# Patient Record
Sex: FEMALE | Race: WHITE | Employment: OTHER | URBAN - METROPOLITAN AREA
[De-identification: names, ages, dates, MRNs, and addresses within clinical notes are randomized per-mention and may not be internally consistent; named-entity substitution may affect disease eponyms.]

---

## 2018-01-30 ENCOUNTER — PATIENT OUTREACH (OUTPATIENT)
Dept: FAMILY MEDICINE CLINIC | Facility: CLINIC | Age: 48
End: 2018-01-30

## 2018-05-31 ENCOUNTER — HOSPITAL ENCOUNTER (EMERGENCY)
Facility: HOSPITAL | Age: 48
Discharge: HOME | End: 2018-05-31
Attending: EMERGENCY MEDICINE
Payer: MEDICARE

## 2018-05-31 VITALS
OXYGEN SATURATION: 98 % | SYSTOLIC BLOOD PRESSURE: 128 MMHG | HEIGHT: 66 IN | TEMPERATURE: 98 F | BODY MASS INDEX: 22.5 KG/M2 | RESPIRATION RATE: 20 BRPM | DIASTOLIC BLOOD PRESSURE: 66 MMHG | HEART RATE: 65 BPM | WEIGHT: 140 LBS

## 2018-05-31 DIAGNOSIS — S51.852A DOG BITE OF LEFT FOREARM, INITIAL ENCOUNTER: Primary | ICD-10-CM
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
## 2018-05-31 PROCEDURE — 99283 EMERGENCY DEPT VISIT LOW MDM: CPT

## 2018-05-31 RX ORDER — AMOXICILLIN AND CLAVULANATE POTASSIUM 875; 125 MG/1; MG/1
1 TABLET, FILM COATED ORAL 2 TIMES DAILY
Qty: 20 TABLET | Refills: 0 | Status: SHIPPED | OUTPATIENT
Start: 2018-05-31 | End: 2018-06-05

## 2018-05-31 RX ORDER — DIAPER,BRIEF,INFANT-TODD,DISP
EACH MISCELLANEOUS AS NEEDED
Status: DISCONTINUED | OUTPATIENT
Start: 2018-05-31 | End: 2018-05-31

## 2018-05-31 RX ORDER — DIAPER,BRIEF,INFANT-TODD,DISP
EACH MISCELLANEOUS ONCE
Status: COMPLETED | OUTPATIENT
Start: 2018-05-31 | End: 2018-05-31

## 2018-05-31 NOTE — ED PROVIDER NOTES
Patient Seen in: [facility] AND [facility] Emergency Department    History   Patient presents with:  Bite (integumentary)    Stated Complaint: dog bite left forearm    HPI    53year old female complains of being bitten in her left forearm while breaking up a dog All other systems reviewed and negative except as noted above. PSFH elements reviewed from today and agreed except as otherwise stated in HPI.     Physical Exam   ED Triage Vitals [05/31/18 0228]  BP: 150/72  Pulse: 67  Resp: 18  Temp: 98.4 °F (36.9 °C) ------------------------------------------------------------    Imaging Results Available and Reviewed while here: No orders to display    ED Medications Administered: Medications - No data to display      Procedures: Wounds irrigated and dressed with anti Anticipatory guidance, discharge instructions, disease/injury specific precautions, return instructions, and follow up information were provided. Condition stable upon discharge.  We also recommended that the patient schedule follow up care with a primary c

## 2018-05-31 NOTE — ED INITIAL ASSESSMENT (HPI)
Pt reports dog bite to left arm from foster dog. Pt has multiple bites to forearms. Dog up to date on shots.

## 2018-08-01 ENCOUNTER — HOSPITAL ENCOUNTER (EMERGENCY)
Facility: HOSPITAL | Age: 48
Discharge: HOME | End: 2018-08-01
Attending: EMERGENCY MEDICINE
Payer: MEDICARE

## 2018-08-01 ENCOUNTER — APPOINTMENT (OUTPATIENT)
Dept: GENERAL RADIOLOGY | Facility: HOSPITAL | Age: 48
End: 2018-08-01
Attending: EMERGENCY MEDICINE
Payer: MEDICARE

## 2018-08-01 VITALS
WEIGHT: 220 LBS | HEIGHT: 66 IN | BODY MASS INDEX: 35.36 KG/M2 | DIASTOLIC BLOOD PRESSURE: 80 MMHG | HEART RATE: 60 BPM | RESPIRATION RATE: 18 BRPM | SYSTOLIC BLOOD PRESSURE: 118 MMHG | OXYGEN SATURATION: 98 % | TEMPERATURE: 97 F

## 2018-08-01 DIAGNOSIS — S16.1XXA STRAIN OF NECK MUSCLE, INITIAL ENCOUNTER: Primary | ICD-10-CM

## 2018-08-01 PROCEDURE — 99284 EMERGENCY DEPT VISIT MOD MDM: CPT

## 2018-08-01 PROCEDURE — 96372 THER/PROPH/DIAG INJ SC/IM: CPT

## 2018-08-01 PROCEDURE — 72040 X-RAY EXAM NECK SPINE 2-3 VW: CPT | Performed by: EMERGENCY MEDICINE

## 2018-08-01 RX ORDER — KETOROLAC TROMETHAMINE 30 MG/ML
60 INJECTION, SOLUTION INTRAMUSCULAR; INTRAVENOUS ONCE
Status: COMPLETED | OUTPATIENT
Start: 2018-08-01 | End: 2018-08-01

## 2018-08-01 RX ORDER — ZOLPIDEM TARTRATE 10 MG/1
10 TABLET ORAL NIGHTLY PRN
COMMUNITY
End: 2019-07-02

## 2018-08-01 RX ORDER — OMEPRAZOLE 20 MG/1
20 CAPSULE, DELAYED RELEASE ORAL
COMMUNITY
End: 2019-06-03

## 2018-08-01 RX ORDER — CYCLOBENZAPRINE HCL 5 MG
5 TABLET ORAL 2 TIMES DAILY PRN
Qty: 15 TABLET | Refills: 0 | Status: SHIPPED | OUTPATIENT
Start: 2018-08-01 | End: 2019-06-03 | Stop reason: DRUGHIGH

## 2018-08-01 NOTE — ED PROVIDER NOTES
Patient Seen in: [facility] AND [facility] Emergency Department    History   Patient presents with:  Neck Pain (musculoskeletal, neurologic)    Stated Complaint: Neck pain x1 week     HPI    Patient presents the emergency department with complaint of left sided 0.00      Years: 0.00         Quit date: 8/1/1998  Smokeless tobacco: Never Used                      Alcohol use:  No                Review of Systems  Constitutional: no fever  Cardiovascular: no chest pain  Respiratory: no shortness of breath  Micky Yeager injection of Toradol I favor this being muscle spasm of the neck    Patient reexamination said slight improvement in pain x-ray does show muscle spasm likely with some mild DJD we discussed treatment activity levels as well as follow-up with her doctor.

## 2019-05-19 ENCOUNTER — HOSPITAL ENCOUNTER (EMERGENCY)
Facility: HOSPITAL | Age: 49
Discharge: HOME | End: 2019-05-19

## 2019-05-19 ENCOUNTER — APPOINTMENT (OUTPATIENT)
Dept: GENERAL RADIOLOGY | Facility: HOSPITAL | Age: 49
End: 2019-05-19
Attending: NURSE PRACTITIONER

## 2019-05-19 ENCOUNTER — APPOINTMENT (OUTPATIENT)
Dept: CT IMAGING | Facility: HOSPITAL | Age: 49
End: 2019-05-19
Attending: NURSE PRACTITIONER

## 2019-05-19 VITALS
OXYGEN SATURATION: 98 % | HEART RATE: 71 BPM | SYSTOLIC BLOOD PRESSURE: 135 MMHG | WEIGHT: 183 LBS | DIASTOLIC BLOOD PRESSURE: 88 MMHG | HEIGHT: 69 IN | BODY MASS INDEX: 27.11 KG/M2 | RESPIRATION RATE: 18 BRPM | TEMPERATURE: 98 F
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
## 2019-05-19 DIAGNOSIS — S80.01XA CONTUSION OF RIGHT KNEE, INITIAL ENCOUNTER: ICD-10-CM

## 2019-05-19 DIAGNOSIS — M25.512 ACUTE PAIN OF LEFT SHOULDER: ICD-10-CM

## 2019-05-19 DIAGNOSIS — S09.90XA CLOSED HEAD INJURY, INITIAL ENCOUNTER: ICD-10-CM

## 2019-05-19 PROCEDURE — 73560 X-RAY EXAM OF KNEE 1 OR 2: CPT | Performed by: NURSE PRACTITIONER

## 2019-05-19 PROCEDURE — 73030 X-RAY EXAM OF SHOULDER: CPT | Performed by: NURSE PRACTITIONER

## 2019-05-19 PROCEDURE — 99284 EMERGENCY DEPT VISIT MOD MDM: CPT | Performed by: NURSE PRACTITIONER

## 2019-05-19 PROCEDURE — 70450 CT HEAD/BRAIN W/O DYE: CPT | Performed by: NURSE PRACTITIONER

## 2019-05-19 PROCEDURE — 81025 URINE PREGNANCY TEST: CPT

## 2019-05-19 RX ORDER — HYDROCODONE BITARTRATE AND ACETAMINOPHEN 5; 325 MG/1; MG/1
1-2 TABLET ORAL EVERY 6 HOURS PRN
Qty: 10 TABLET | Refills: 0 | Status: SHIPPED | OUTPATIENT
Start: 2019-05-19 | End: 2019-05-26

## 2019-05-19 RX ORDER — ACETAMINOPHEN 500 MG
1000 TABLET ORAL ONCE
Status: COMPLETED | OUTPATIENT
Start: 2019-05-19 | End: 2019-05-19
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
## 2019-05-19 NOTE — ED NOTES
Patient provided discharge instructions. Patient provided with instructions of how and when to follow up with healthcare providers. Patient provided instructions of when to seek emergency care. Paper prescriptions from ER physician provided to patient.  Gino Casas

## 2019-06-03 PROBLEM — M79.7 FIBROMYALGIA: Status: ACTIVE | Noted: 2019-01-01

## 2019-07-02 PROBLEM — F32.4 MAJOR DEPRESSIVE DISORDER WITH SINGLE EPISODE, IN PARTIAL REMISSION (HCC): Status: ACTIVE | Noted: 2019-07-02

## 2019-07-02 PROBLEM — F51.01 PRIMARY INSOMNIA: Status: ACTIVE | Noted: 2019-07-02

## 2019-09-10 PROBLEM — Z98.890 S/P BILATERAL INGUINAL HERNIA REPAIR: Status: ACTIVE | Noted: 2019-09-10

## 2019-09-10 PROBLEM — E66.01 CLASS 2 SEVERE OBESITY DUE TO EXCESS CALORIES WITH SERIOUS COMORBIDITY AND BODY MASS INDEX (BMI) OF 39.0 TO 39.9 IN ADULT: Status: ACTIVE | Noted: 2019-09-10

## 2019-09-10 PROBLEM — Z87.19 S/P BILATERAL INGUINAL HERNIA REPAIR: Status: ACTIVE | Noted: 2019-09-10

## 2019-09-10 PROBLEM — E66.01 CLASS 2 SEVERE OBESITY DUE TO EXCESS CALORIES WITH SERIOUS COMORBIDITY AND BODY MASS INDEX (BMI) OF 36.0 TO 36.9 IN ADULT (HCC): Status: ACTIVE | Noted: 2019-09-10

## 2019-09-10 PROBLEM — E66.01 CLASS 2 SEVERE OBESITY DUE TO EXCESS CALORIES WITH SERIOUS COMORBIDITY AND BODY MASS INDEX (BMI) OF 36.0 TO 36.9 IN ADULT: Status: ACTIVE | Noted: 2019-09-10

## 2019-09-10 PROBLEM — E66.01 CLASS 2 SEVERE OBESITY DUE TO EXCESS CALORIES WITH SERIOUS COMORBIDITY AND BODY MASS INDEX (BMI) OF 39.0 TO 39.9 IN ADULT (HCC): Status: ACTIVE | Noted: 2019-09-10

## 2019-12-06 PROBLEM — F32.A DEPRESSION: Status: ACTIVE | Noted: 2019-04-18

## 2019-12-06 PROBLEM — M21.70 LEG LENGTH DISCREPANCY: Status: ACTIVE | Noted: 2017-10-26

## 2019-12-06 PROBLEM — M79.7 FIBROMYALGIA AFFECTING MULTIPLE SITES: Status: ACTIVE | Noted: 2018-02-01

## 2020-02-13 PROBLEM — K22.70 BARRETT'S ESOPHAGUS: Status: ACTIVE | Noted: 2019-12-26

## 2020-08-14 PROBLEM — F32.A DEPRESSION: Status: RESOLVED | Noted: 2019-04-18 | Resolved: 2020-08-14

## 2021-04-11 DIAGNOSIS — Z23 NEED FOR VACCINATION: ICD-10-CM

## 2022-03-21 PROBLEM — M54.16 LUMBAR RADICULOPATHY: Status: ACTIVE | Noted: 2022-03-21

## 2022-05-05 ENCOUNTER — HOSPITAL ENCOUNTER (EMERGENCY)
Facility: HOSPITAL | Age: 52
Discharge: HOME | End: 2022-05-06
Attending: EMERGENCY MEDICINE
Payer: MEDICARE
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
## 2022-05-05 PROCEDURE — 96372 THER/PROPH/DIAG INJ SC/IM: CPT

## 2022-05-05 PROCEDURE — 99284 EMERGENCY DEPT VISIT MOD MDM: CPT

## 2022-05-05 PROCEDURE — 90471 IMMUNIZATION ADMIN: CPT

## 2022-05-05 RX ORDER — AMOXICILLIN AND CLAVULANATE POTASSIUM 875; 125 MG/1; MG/1
1 TABLET, FILM COATED ORAL 2 TIMES DAILY
Qty: 14 TABLET | Refills: 0 | Status: SHIPPED | OUTPATIENT
Start: 2022-05-05 | End: 2022-05-12

## 2022-05-06 VITALS
WEIGHT: 213.88 LBS | HEIGHT: 66 IN | OXYGEN SATURATION: 97 % | SYSTOLIC BLOOD PRESSURE: 141 MMHG | RESPIRATION RATE: 18 BRPM | BODY MASS INDEX: 34.37 KG/M2 | DIASTOLIC BLOOD PRESSURE: 85 MMHG | TEMPERATURE: 98 F | HEART RATE: 57 BPM

## 2022-05-06 NOTE — ED QUICK NOTES
Patient given rabies vaccine to right deltoid. Patient given Rabies immune globulin first to left finger site of bite by HU Castillo. Total dose was 6mL. Approx 0.5mL injected by HU at site of bite to left pointer finger. 3mL given to left deltoid (max dose for deltoid muscle). Remainder of 2.5mL given to left thigh. Site of injections discussed with pharmacy, charge RN, and HU Castillo. Patient aware of follow-up for rabies vaccine. Tdap UTD.

## 2022-05-06 NOTE — ED QUICK NOTES
Patient presents to ED after being bit by a raccoon around 6:30pm tonight. Patient states she was wearing extraction gloves when trying to help the animal and the animal bit through the gloves and broke her skin. Patient states the raccoon appeared sick.

## 2022-05-08 ENCOUNTER — HOSPITAL ENCOUNTER (OUTPATIENT)
Age: 52
Discharge: HOME | End: 2022-05-08
Payer: MEDICARE

## 2022-05-08 VITALS — TEMPERATURE: 98 F

## 2022-05-08 PROCEDURE — 90675 RABIES VACCINE IM: CPT | Performed by: NURSE PRACTITIONER

## 2022-05-08 PROCEDURE — 90471 IMMUNIZATION ADMIN: CPT | Performed by: NURSE PRACTITIONER

## 2022-05-09 NOTE — CM/SW NOTE
Spoke with patient at [phone] regarding her follow up series of rabies vaccinations that she has been instructed to complete. Patient bitten by raccoon on 5/4/ and came to ER on 5/5/22. Rabies series of vaccinations initiated in ER on 5/5/22. Day #0  = 5/5/22   -  Initial Rabies shot   Received  Day #3  = 5/8/22   -  2nd Shot                Received  Day #7  = 5/12/22 -  3rd Shot                 ________  Day #14= 5/19/22  - 4th Shot                 ________    Patient stated that she still had the raccoon after she was bitten, and the [employer] came and took raccoon, killed it and are testing animal for rabies. Patient stated that she will hear on Thursday if raccoon had rabies or not. Patient stated that if animal comes back negative for rabies, she will not be completing the rest of the rabies shot series. If raccoon comes back positive, she will get last 2 rabies shots on above dates. No need to call patient again. MARILOU reached out to [facility] at [id number], and she confirmed that patient does not have to complete the rabies series IF animal does not have rabies, but patient should be contacted by [facility] with results.

## 2022-08-03 ENCOUNTER — HOSPITAL ENCOUNTER (OUTPATIENT)
Age: 52
Discharge: HOME | End: 2022-08-03
Payer: MEDICARE

## 2022-08-03 VITALS
OXYGEN SATURATION: 100 % | HEART RATE: 77 BPM | SYSTOLIC BLOOD PRESSURE: 139 MMHG | TEMPERATURE: 98 F | DIASTOLIC BLOOD PRESSURE: 92 MMHG | RESPIRATION RATE: 16 BRPM

## 2022-08-03 DIAGNOSIS — R07.89 CHEST PAIN, NON-CARDIAC: Primary | ICD-10-CM

## 2022-08-03 LAB
#MXD IC: 0.6 X10ˆ3/UL (ref 0.1–1)
BUN BLD-MCNC: 9 MG/DL (ref 7–18)
CHLORIDE BLD-SCNC: 106 MMOL/L (ref 98–112)
CO2 BLD-SCNC: 24 MMOL/L (ref 21–32)
CREAT BLD-MCNC: 0.8 MG/DL
GFR SERPLBLD BASED ON 1.73 SQ M-ARVRAT: 89 ML/MIN/1.73M2 (ref 60–?)
GLUCOSE BLD-MCNC: 104 MG/DL (ref 70–99)
HCT VFR BLD AUTO: 41.4 %
HCT VFR BLD CALC: 44 %
HGB BLD-MCNC: 13.4 G/DL
ISTAT IONIZED CALCIUM FOR CHEM 8: 1.22 MMOL/L (ref 1.12–1.32)
LYMPHOCYTES # BLD AUTO: 3.3 X10ˆ3/UL (ref 1–4)
LYMPHOCYTES NFR BLD AUTO: 33.1 %
MCH RBC QN AUTO: 26.4 PG (ref 26–34)
MCHC RBC AUTO-ENTMCNC: 32.4 G/DL (ref 31–37)
MCV RBC AUTO: 81.7 FL (ref 80–100)
MIXED CELL %: 5.5 %
NEUTROPHILS # BLD AUTO: 6.2 X10ˆ3/UL (ref 1.5–7.7)
NEUTROPHILS NFR BLD AUTO: 61.4 %
PLATELET # BLD AUTO: 323 X10ˆ3/UL (ref 150–450)
POTASSIUM BLD-SCNC: 4.3 MMOL/L (ref 3.6–5.1)
RBC # BLD AUTO: 5.07 X10ˆ6/UL
SODIUM BLD-SCNC: 140 MMOL/L (ref 136–145)
TROPONIN I BLD-MCNC: <0.02 NG/ML
WBC # BLD AUTO: 10.1 X10ˆ3/UL (ref 4–11)

## 2022-08-03 PROCEDURE — 99214 OFFICE O/P EST MOD 30 MIN: CPT | Performed by: PHYSICIAN ASSISTANT

## 2022-08-03 PROCEDURE — 85025 COMPLETE CBC W/AUTO DIFF WBC: CPT | Performed by: PHYSICIAN ASSISTANT

## 2022-08-03 PROCEDURE — 93000 ELECTROCARDIOGRAM COMPLETE: CPT | Performed by: PHYSICIAN ASSISTANT

## 2022-08-03 PROCEDURE — 80047 BASIC METABLC PNL IONIZED CA: CPT | Performed by: PHYSICIAN ASSISTANT

## 2022-08-03 PROCEDURE — 84484 ASSAY OF TROPONIN QUANT: CPT | Performed by: PHYSICIAN ASSISTANT

## 2023-02-09 ENCOUNTER — LAB ENCOUNTER (OUTPATIENT)
Dept: LAB | Facility: REFERENCE LAB | Age: 53
End: 2023-02-09
Attending: FAMILY MEDICINE
Payer: MEDICARE

## 2023-02-09 DIAGNOSIS — E55.9 VITAMIN D DEFICIENCY: ICD-10-CM

## 2023-02-09 LAB — VIT D+METAB SERPL-MCNC: 18 NG/ML (ref 30–100)

## 2023-02-09 PROCEDURE — 36415 COLL VENOUS BLD VENIPUNCTURE: CPT

## 2023-02-09 PROCEDURE — 82306 VITAMIN D 25 HYDROXY: CPT

## 2023-02-19 ENCOUNTER — E-VISIT (OUTPATIENT)
Dept: TELEHEALTH | Age: 53
End: 2023-02-19

## 2023-02-19 DIAGNOSIS — U07.1 POSITIVE SELF-ADMINISTERED ANTIGEN TEST FOR COVID-19: Primary | ICD-10-CM

## 2023-02-19 DIAGNOSIS — U07.1 COVID-19: ICD-10-CM

## 2023-02-19 PROCEDURE — 99421 OL DIG E/M SVC 5-10 MIN: CPT | Performed by: NURSE PRACTITIONER

## 2023-09-12 ENCOUNTER — APPOINTMENT (OUTPATIENT)
Dept: GENERAL RADIOLOGY | Facility: HOSPITAL | Age: 53
End: 2023-09-12
Payer: MEDICARE

## 2023-09-12 ENCOUNTER — HOSPITAL ENCOUNTER (EMERGENCY)
Facility: HOSPITAL | Age: 53
Discharge: HOME | End: 2023-09-13
Attending: EMERGENCY MEDICINE
Payer: MEDICARE

## 2023-09-12 DIAGNOSIS — R07.9 CHEST PAIN OF UNCERTAIN ETIOLOGY: Primary | ICD-10-CM

## 2023-09-12 LAB
ALBUMIN SERPL-MCNC: 3.8 G/DL (ref 3.4–5)
ALBUMIN/GLOB SERPL: 1.1 {RATIO} (ref 1–2)
ALP LIVER SERPL-CCNC: 78 U/L
ALT SERPL-CCNC: 19 U/L
ANION GAP SERPL CALC-SCNC: 5 MMOL/L (ref 0–18)
AST SERPL-CCNC: 17 U/L (ref 15–37)
BILIRUB SERPL-MCNC: 0.4 MG/DL (ref 0.1–2)
BUN BLD-MCNC: 11 MG/DL (ref 7–18)
BUN/CREAT SERPL: 12.6 (ref 10–20)
CALCIUM BLD-MCNC: 8.8 MG/DL (ref 8.5–10.1)
CHLORIDE SERPL-SCNC: 107 MMOL/L (ref 98–112)
CO2 SERPL-SCNC: 28 MMOL/L (ref 21–32)
CREAT BLD-MCNC: 0.87 MG/DL
EGFRCR SERPLBLD CKD-EPI 2021: 80 ML/MIN/1.73M2 (ref 60–?)
GLOBULIN PLAS-MCNC: 3.4 G/DL (ref 2.8–4.4)
GLUCOSE BLD-MCNC: 103 MG/DL (ref 70–99)
OSMOLALITY SERPL CALC.SUM OF ELEC: 290 MOSM/KG (ref 275–295)
POTASSIUM SERPL-SCNC: 3.9 MMOL/L (ref 3.5–5.1)
PROT SERPL-MCNC: 7.2 G/DL (ref 6.4–8.2)
SODIUM SERPL-SCNC: 140 MMOL/L (ref 136–145)
TROPONIN I HIGH SENSITIVITY: 5 NG/L

## 2023-09-12 PROCEDURE — 36415 COLL VENOUS BLD VENIPUNCTURE: CPT

## 2023-09-12 PROCEDURE — 85060 BLOOD SMEAR INTERPRETATION: CPT | Performed by: EMERGENCY MEDICINE

## 2023-09-12 PROCEDURE — 71045 X-RAY EXAM CHEST 1 VIEW: CPT

## 2023-09-12 PROCEDURE — 99285 EMERGENCY DEPT VISIT HI MDM: CPT

## 2023-09-12 PROCEDURE — 99284 EMERGENCY DEPT VISIT MOD MDM: CPT

## 2023-09-12 PROCEDURE — 71045 X-RAY EXAM CHEST 1 VIEW: CPT | Performed by: EMERGENCY MEDICINE

## 2023-09-12 PROCEDURE — 93010 ELECTROCARDIOGRAM REPORT: CPT

## 2023-09-12 PROCEDURE — 80053 COMPREHEN METABOLIC PANEL: CPT | Performed by: EMERGENCY MEDICINE

## 2023-09-12 PROCEDURE — 85025 COMPLETE CBC W/AUTO DIFF WBC: CPT | Performed by: EMERGENCY MEDICINE

## 2023-09-12 PROCEDURE — 84484 ASSAY OF TROPONIN QUANT: CPT

## 2023-09-12 PROCEDURE — 84484 ASSAY OF TROPONIN QUANT: CPT | Performed by: EMERGENCY MEDICINE

## 2023-09-12 PROCEDURE — 80053 COMPREHEN METABOLIC PANEL: CPT

## 2023-09-12 PROCEDURE — 93005 ELECTROCARDIOGRAM TRACING: CPT

## 2023-09-12 PROCEDURE — 85025 COMPLETE CBC W/AUTO DIFF WBC: CPT

## 2023-09-13 ENCOUNTER — APPOINTMENT (OUTPATIENT)
Dept: CT IMAGING | Facility: HOSPITAL | Age: 53
End: 2023-09-13
Attending: EMERGENCY MEDICINE
Payer: MEDICARE

## 2023-09-13 VITALS
SYSTOLIC BLOOD PRESSURE: 152 MMHG | HEIGHT: 66 IN | DIASTOLIC BLOOD PRESSURE: 87 MMHG | HEART RATE: 64 BPM | BODY MASS INDEX: 32.14 KG/M2 | WEIGHT: 200 LBS | TEMPERATURE: 98 F | OXYGEN SATURATION: 100 % | RESPIRATION RATE: 18 BRPM

## 2023-09-13 LAB
ATRIAL RATE: 62 BPM
BASOPHILS # BLD AUTO: 0.06 X10(3) UL (ref 0–0.2)
BASOPHILS NFR BLD AUTO: 0.5 %
DEPRECATED RDW RBC AUTO: 39.8 FL (ref 35.1–46.3)
EOSINOPHIL # BLD AUTO: 0.19 X10(3) UL (ref 0–0.7)
EOSINOPHIL NFR BLD AUTO: 1.7 %
ERYTHROCYTE [DISTWIDTH] IN BLOOD BY AUTOMATED COUNT: 13.5 % (ref 11–15)
HCT VFR BLD AUTO: 41.2 %
HGB BLD-MCNC: 13.4 G/DL
IMM GRANULOCYTES # BLD AUTO: 0.04 X10(3) UL (ref 0–1)
IMM GRANULOCYTES NFR BLD: 0.4 %
LYMPHOCYTES # BLD AUTO: 5.44 X10(3) UL (ref 1–4)
LYMPHOCYTES NFR BLD AUTO: 49.2 %
MCH RBC QN AUTO: 26.4 PG (ref 26–34)
MCHC RBC AUTO-ENTMCNC: 32.5 G/DL (ref 31–37)
MCV RBC AUTO: 81.3 FL
MONOCYTES # BLD AUTO: 0.58 X10(3) UL (ref 0.1–1)
MONOCYTES NFR BLD AUTO: 5.2 %
NEUTROPHILS # BLD AUTO: 4.75 X10 (3) UL (ref 1.5–7.7)
NEUTROPHILS # BLD AUTO: 4.75 X10(3) UL (ref 1.5–7.7)
NEUTROPHILS NFR BLD AUTO: 43 %
P AXIS: 46 DEGREES
P-R INTERVAL: 146 MS
PLATELET # BLD AUTO: 301 10(3)UL (ref 150–450)
Q-T INTERVAL: 428 MS
QRS DURATION: 98 MS
QTC CALCULATION (BEZET): 434 MS
R AXIS: 15 DEGREES
RBC # BLD AUTO: 5.07 X10(6)UL
T AXIS: 61 DEGREES
VENTRICULAR RATE: 62 BPM
WBC # BLD AUTO: 11.1 X10(3) UL (ref 4–11)

## 2023-09-13 PROCEDURE — 71260 CT THORAX DX C+: CPT | Performed by: EMERGENCY MEDICINE

## 2023-09-13 NOTE — ED QUICK NOTES
Pt resting comfortably on ED cart with no signs of distress noted. Pt assisted into gown and cardiac monitor placed. Call light within reach and bed in lowest position.

## 2023-09-13 NOTE — ED INITIAL ASSESSMENT (HPI)
Pt to ED with c/o chest pressure and difficulty taking a deep breath since 2 pm today. Pt with recent surgery at [facility] for Interstim Implantation. Pt denies cough or fever. No respiratory distress noted. Pt skin parameters WNL. Pt is alert and oriented x4. No hx of blood clots. No thinners or asa.

## 2024-06-19 ENCOUNTER — HOSPITAL ENCOUNTER (EMERGENCY)
Facility: HOSPITAL | Age: 54
Discharge: HOME | End: 2024-06-19
Attending: EMERGENCY MEDICINE

## 2024-06-19 ENCOUNTER — APPOINTMENT (OUTPATIENT)
Dept: GENERAL RADIOLOGY | Facility: HOSPITAL | Age: 54
End: 2024-06-19
Attending: EMERGENCY MEDICINE

## 2024-06-19 VITALS
TEMPERATURE: 98 F | OXYGEN SATURATION: 97 % | HEART RATE: 66 BPM | SYSTOLIC BLOOD PRESSURE: 150 MMHG | DIASTOLIC BLOOD PRESSURE: 98 MMHG | RESPIRATION RATE: 22 BRPM

## 2024-06-19 DIAGNOSIS — R03.0 ELEVATED BLOOD-PRESSURE READING WITHOUT DIAGNOSIS OF HYPERTENSION: Primary | ICD-10-CM

## 2024-06-19 LAB — TROPONIN I SERPL HS-MCNC: 3 NG/L

## 2024-06-19 PROCEDURE — 36415 COLL VENOUS BLD VENIPUNCTURE: CPT

## 2024-06-19 PROCEDURE — 84484 ASSAY OF TROPONIN QUANT: CPT | Performed by: EMERGENCY MEDICINE

## 2024-06-19 PROCEDURE — 93010 ELECTROCARDIOGRAM REPORT: CPT

## 2024-06-19 PROCEDURE — 71045 X-RAY EXAM CHEST 1 VIEW: CPT | Performed by: EMERGENCY MEDICINE

## 2024-06-19 PROCEDURE — 99284 EMERGENCY DEPT VISIT MOD MDM: CPT

## 2024-06-19 PROCEDURE — 93005 ELECTROCARDIOGRAM TRACING: CPT

## 2024-06-19 RX ORDER — HYDROCHLOROTHIAZIDE 12.5 MG/1
12.5 TABLET ORAL ONCE
Status: COMPLETED | OUTPATIENT
Start: 2024-06-19 | End: 2024-06-19

## 2024-06-19 RX ORDER — HYDROCHLOROTHIAZIDE 12.5 MG/1
12.5 TABLET ORAL DAILY
Qty: 30 TABLET | Refills: 0 | Status: SHIPPED | OUTPATIENT
Start: 2024-06-19 | End: 2024-07-19

## 2024-06-20 LAB
ATRIAL RATE: 66 BPM
P AXIS: 42 DEGREES
P-R INTERVAL: 160 MS
Q-T INTERVAL: 408 MS
QRS DURATION: 90 MS
QTC CALCULATION (BEZET): 427 MS
R AXIS: -10 DEGREES
T AXIS: 56 DEGREES
VENTRICULAR RATE: 66 BPM

## 2024-06-20 NOTE — ED PROVIDER NOTES
Patient Seen in: [facility] Emergency Department    History     Chief Complaint   Patient presents with    Hypertension     Stated Complaint: Hypertension     HPI    54-year-old female with past medical history of dyslipidemia presenting for evaluation with complaints of elevated blood pressure - systolics noted to 180s in  setting of questionable dyspnea shortness of breath. Patient with routine labs drawn earlier today by primary care in setting of aforementioned.  No chest pain. No exertional/orthopneic/pleuritic complaints, leg swelling at baseline. No headache or dizziness.  No focal weakness or vision change.  Noting lower extremity swelling to be at baseline.  On Adderall chronically and drinks latte every morning without change to either, no new stimulants or decongestants.     Past Medical History:    ADD (attention deficit disorder)    Anemia    Tolentino's esophagus    Chronic low back pain without sciatica    Depression    Fibromyalgia    GERD (gastroesophageal reflux disease)    With history of Tolentino's esophagus    Hyperlipidemia    Impaired fasting glucose    L-S radiculopathy    Lumbar stenosis    Major depressive disorder with single episode, in partial remission (HCC)    Nerve damage    Nicotine dependence    Overactive bladder    Primary insomnia    S/P bilateral inguinal hernia repair    Skin cancer    Spinal fusion failure    Varicose veins with pain    Vitamin D deficiency       Past Surgical History:   Procedure Laterality Date    Back surgery  07/2012    L5/S1 fusion--[facility]    Back surgery  08/2012    'Clean out infection' from recent back surgery, Ab Tx thru PICC X 3 mos.    Egd  12/26/2019    Dr. Powers: hiatal hernia, Tolentino's, esophagitis - 6 month recall    Hernia surgery Bilateral     Inguinal    Implant left Left 2011    Implant right Right 2011    Other  2015    Medtonic Lumbar Stim System Implant to aid in Tx for p-surgical Incontinence    Other Bilateral 2016    b/l elbow  surgery to repair ligament damage--[facility]    Other surgical history      breast augmentation    Other surgical history      rhinoplasty    Thin prep pap  2022    normal    Tonsillectomy              Family History   Adopted: Yes   Problem Relation Age of Onset    Alcohol and Other Disorders Associated Father     Other (Other) Father         SLE    Other (esophageal cancer) Father     Breast Cancer Maternal Aunt 31        age of dx 31    Other (AML) Mother 77    Breast Cancer Cousin 40        age of dx 40       Social History     Socioeconomic History    Marital status:     Number of children: 1   Occupational History    Occupation: Permanent Disability since    Tobacco Use    Smoking status: Former     Current packs/day: 0.00     Types: Cigarettes     Quit date: 1998     Years since quittin.9    Smokeless tobacco: Never   Vaping Use    Vaping status: Some Days   Substance and Sexual Activity    Alcohol use: No    Drug use: Not Currently     Comment: marijuana occasionally       Review of Systems :  Constitutional: As per HPI  Respiratory: Negative for cough; (?) shortness of breath.      Positive for stated complaint: Hypertension  Other systems are as noted in HPI.  Constitutional and vital signs reviewed.      All other systems reviewed and negative except as noted above.    PSFH elements reviewed from today and agreed except as otherwise stated in HPI.    Physical Exam     ED Triage Vitals [24 2141]   /81   Pulse 86   Resp 22   Temp 97.5 °F (36.4 °C)   Temp src Temporal   SpO2 99 %   O2 Device None (Room air)       Current:/84   Pulse 72   Temp 97.5 °F (36.4 °C) (Temporal)   Resp 22   SpO2 97%         Physical Exam   Constitutional: No distress.   HEENT: MMM.  Head: Normocephalic.   Eyes: No injection.   Cardiovascular: RRR.   Pulmonary/Chest: Effort normal. CTAB.  Abdominal: Soft. Nontender.  Musculoskeletal: No gross deformity. BLE with 1+  edema.   Neurological: Alert.   Skin: Skin is warm.   Psychiatric: Cooperative.  Nursing note and vitals reviewed.        ED Course     Labs Reviewed   TROPONIN I HIGH SENSITIVITY - Normal     Preliminary Radiology Report  [facility], [facility]  [phone] - Phone    [facility]    NAME: VAISHNAVI JOHNSON    DATE OF EXAM: 06/19/2024  Patient No:  [id number]  Physician:  BONG^JENNA ^2  YOB: 1970    Past Medical History (entered by Technologist):    Reason For Exam (entered by Technologist):  Tingling of extremities and hypertensive today.  Other Notes (entered by Technologist): Pod 4. Room 42.    Additional Information (per [facility]): Comparison chest x-ray 9/12/23.      Chest portable AP view 6/19/24 at 10:43 PM      IMPRESSION:    No significant change.  No acute cardiopulmonary abnormality is identified.    The results were faxed/finalized only at 12:23 AM ET. If you would like to discuss this case directly please call [phone] extension [phone].  If you cannot reach me at this number, do not leave a voicemail.  Please call [phone] ext [phone] and ask for the next available Radiologist.      Ashley Lang M.D.  This report has been electronically signed and verified by the Radiologist whose name is printed above.       This report contains privileged and confidential information and is intended solely for the use of the individual or entity to which it is addressed. If you are not the intended recipient of this report, you are hereby notified that any copying, distribution, dissemination or action taken in relation to the contents of this report is strictly prohibited and may be unlawful. If you have received this report in error, please notify the sender immediately at [phone] and permanently delete the original report and destroy any copies or printouts.  EKG    Rate, intervals and axes as noted on EKG Report.  Rate: 66  Rhythm: Sinus Rhythm  Reading: NSR 66  without BIRDGER as independently interpreted by myself         Heart Score:    HEART Score      Title      Criteria Score   Age: 45-64 Age Score: 1   History: Slightly Suspicious Hx Score: 0     EKG: Normal EKG Score: 0   HTN: No   Hypercholesterolemia: Yes   Atherosclerosis/PVD: No     DM: No   BMI>30kg/m2: Yes   Smoking: No   Family History: No         Other Risk Factor Score: 2             Lab Results   Component Value Date    TROPHS 3 06/19/2024           HEART Score: 2        Risk of adverse cardiac event is 0.9-1.7%            ED Course as of 06/20/24 0041  ------------------------------------------------------------  Time: 06/19 2346  Comment: Resting comfortably, ED course nonacute.       MDM   DIFFERENTIAL DIAGNOSIS: After history and physical exam differential diagnosis includes but is not limited to hypertension, ACS/myocarditis.    Pulse ox: 97%:Normal on RA, as independently interpreted by myself    Medical Decision Making  Evaluation for elevated blood pressure with questionable shortness of breath and low risk heart patient without complaints upon ED arrival.  Outpatient labs reviewed, chest x-ray/EKG/troponin unremarkable in setting of improved hemodynamics versus prior to arrival.  Given leg swelling with elevated blood pressure, advised need for ongoing outpatient follow-up/blood pressure monitoring though will initiate low-dose hydrochlorothiazide pending same.    Problems Addressed:  Elevated blood-pressure reading without diagnosis of hypertension: acute illness or injury    Amount and/or Complexity of Data Reviewed  External Data Reviewed: labs and ECG.     Details: 9/12/2023 EKG, outpatient labs from morning/prior to arrival reviewed  Labs: ordered. Decision-making details documented in ED Course.  Radiology: ordered and independent interpretation performed. Decision-making details documented in ED Course.     Details: CXR without obvious pneumothorax as independently interpreted by  myself  ECG/medicine tests: ordered and independent interpretation performed. Decision-making details documented in ED Course.    Risk  Prescription drug management.      I was wearing at minimum a facemask and eye protection throughout this encounter with handwashing performed prior and after patient evaluation without personal hand/facial/oropharyngeal contact and gloves worn throughout encounter. See note and/or contact this provider for further PPE details.      We recommend that you schedule follow up care with a primary care provider within the next three months to obtain basic health screening including reassessment of your blood pressure.      You had elevated blood pressure today and you need to follow up with your doctor for a repeat blood pressure check and further discussion of lifestyle modifications that include Weight Reduction - Dietary Sodium Restriction - Increased Physical Activity and Moderation in alcohol (ETOH) Consumption. If possible check your pressure at home and keep a blood pressure log to bring to your physician.  Disposition and Plan     Clinical Impression:  1. Elevated blood-pressure reading without diagnosis of hypertension        Disposition:  Discharge    Follow-up:  Christiano Johnson MD  [address] [place] [address]  [phone]    Call  For followup, re-evaluation and ongoing monitoring of your blood pressure.      Medications Prescribed:  Discharge Medication List as of 6/19/2024 11:46 PM        START taking these medications    Details   hydroCHLOROthiazide 12.5 MG Oral Tab Take 1 tablet (12.5 mg total) by mouth daily., Normal, Disp-30 tablet, R-0
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
## 2024-06-21 ENCOUNTER — HOSPITAL ENCOUNTER (EMERGENCY)
Facility: HOSPITAL | Age: 54
Discharge: HOME | End: 2024-06-21
Attending: EMERGENCY MEDICINE

## 2024-06-21 ENCOUNTER — APPOINTMENT (OUTPATIENT)
Dept: GENERAL RADIOLOGY | Facility: HOSPITAL | Age: 54
End: 2024-06-21
Attending: EMERGENCY MEDICINE

## 2024-06-21 ENCOUNTER — APPOINTMENT (OUTPATIENT)
Dept: CT IMAGING | Facility: HOSPITAL | Age: 54
End: 2024-06-21
Attending: EMERGENCY MEDICINE

## 2024-06-21 VITALS
DIASTOLIC BLOOD PRESSURE: 73 MMHG | WEIGHT: 210 LBS | SYSTOLIC BLOOD PRESSURE: 129 MMHG | HEART RATE: 68 BPM | HEIGHT: 66 IN | BODY MASS INDEX: 33.75 KG/M2 | OXYGEN SATURATION: 97 % | RESPIRATION RATE: 17 BRPM | TEMPERATURE: 98 F

## 2024-06-21 DIAGNOSIS — I10 POORLY-CONTROLLED HYPERTENSION: Primary | ICD-10-CM

## 2024-06-21 LAB
ANION GAP SERPL CALC-SCNC: 5 MMOL/L (ref 0–18)
ATRIAL RATE: 82 BPM
BUN BLD-MCNC: 10 MG/DL (ref 9–23)
BUN/CREAT SERPL: 9.7 (ref 10–20)
CALCIUM BLD-MCNC: 9.6 MG/DL (ref 8.7–10.4)
CHLORIDE SERPL-SCNC: 104 MMOL/L (ref 98–112)
CO2 SERPL-SCNC: 28 MMOL/L (ref 21–32)
CREAT BLD-MCNC: 1.03 MG/DL
EGFRCR SERPLBLD CKD-EPI 2021: 65 ML/MIN/1.73M2 (ref 60–?)
GLUCOSE BLD-MCNC: 114 MG/DL (ref 70–99)
OSMOLALITY SERPL CALC.SUM OF ELEC: 284 MOSM/KG (ref 275–295)
P AXIS: 43 DEGREES
P-R INTERVAL: 144 MS
POTASSIUM SERPL-SCNC: 4.2 MMOL/L (ref 3.5–5.1)
Q-T INTERVAL: 374 MS
QRS DURATION: 84 MS
QTC CALCULATION (BEZET): 436 MS
R AXIS: -30 DEGREES
SODIUM SERPL-SCNC: 137 MMOL/L (ref 136–145)
T AXIS: 53 DEGREES
TROPONIN I SERPL HS-MCNC: 3 NG/L
VENTRICULAR RATE: 82 BPM

## 2024-06-21 PROCEDURE — 85025 COMPLETE CBC W/AUTO DIFF WBC: CPT | Performed by: EMERGENCY MEDICINE

## 2024-06-21 PROCEDURE — 36415 COLL VENOUS BLD VENIPUNCTURE: CPT

## 2024-06-21 PROCEDURE — 99285 EMERGENCY DEPT VISIT HI MDM: CPT

## 2024-06-21 PROCEDURE — 85060 BLOOD SMEAR INTERPRETATION: CPT | Performed by: EMERGENCY MEDICINE

## 2024-06-21 PROCEDURE — 99284 EMERGENCY DEPT VISIT MOD MDM: CPT

## 2024-06-21 PROCEDURE — 93010 ELECTROCARDIOGRAM REPORT: CPT

## 2024-06-21 PROCEDURE — 71046 X-RAY EXAM CHEST 2 VIEWS: CPT | Performed by: EMERGENCY MEDICINE

## 2024-06-21 PROCEDURE — 84484 ASSAY OF TROPONIN QUANT: CPT | Performed by: EMERGENCY MEDICINE

## 2024-06-21 PROCEDURE — 93005 ELECTROCARDIOGRAM TRACING: CPT

## 2024-06-21 PROCEDURE — 80048 BASIC METABOLIC PNL TOTAL CA: CPT | Performed by: EMERGENCY MEDICINE

## 2024-06-21 PROCEDURE — 70450 CT HEAD/BRAIN W/O DYE: CPT | Performed by: EMERGENCY MEDICINE

## 2024-06-21 RX ORDER — LISINOPRIL 10 MG/1
10 TABLET ORAL DAILY
Qty: 14 TABLET | Refills: 0 | Status: SHIPPED | OUTPATIENT
Start: 2024-06-21 | End: 2024-07-05

## 2024-06-21 NOTE — ED INITIAL ASSESSMENT (HPI)
Patient reports she was out to dinner when she took her BP and read in the 150s. Patient reports she was seen in the ER a few days ago for the same thing. Patient reports calling her pcp and taking her BP while on the phone with provider and was advised to go to ER. Patient reports \"heaviness in all four limbs\". Denies chest pain/sob.

## 2024-06-21 NOTE — DISCHARGE INSTRUCTIONS
Start Lisinopril 10 mg daily at night. Continue hydrochlorothiazide as prescribed. Follow up with PMD for recheck.

## 2024-06-21 NOTE — ED QUICK NOTES
Pt reports home BP machine is her father's and most likely old. SBP with home machine 150s, recheck with ER monitor and /86. Pt's SBP has been consistently 140s or lower. Dr. Azevedo updated.

## 2024-06-21 NOTE — ED PROVIDER NOTES
Patient Seen in: [facility] Emergency Department      History     Chief Complaint   Patient presents with    Hypertension     Stated Complaint: HTN    Subjective:   HPI    Patient presents emergency department with elevated blood pressure.  She states that she was seen a few days ago and had elevated blood pressure and was prescribed hydrochlorothiazide which she has been taking.  She states her blood pressures have been fluctuating and last night she had a blood pressure with a systolic reading of 170 so she took one of her husbands lisinopril.  Today she called her doctor to make a follow-up appointment and they had her check her blood pressure again and she said it was high so they instructed her to come here to the emergency department.  She states that she has tingling in all 4 extremities which has been constant for the last 3 days.  She denies weakness.  There is no headache or visual disturbance.  There is no chest pain.  She was concerned because her heart was described as \"enlarged\" on the chest x-ray she had the other day.    Objective:   Past Medical History:    ADD (attention deficit disorder)    Anemia    Tolentino's esophagus    Chronic low back pain without sciatica    Depression    Fibromyalgia    GERD (gastroesophageal reflux disease)    With history of Tolentino's esophagus    Hyperlipidemia    Impaired fasting glucose    L-S radiculopathy    Lumbar stenosis    Major depressive disorder with single episode, in partial remission (HCC)    Nerve damage    Nicotine dependence    Overactive bladder    Primary insomnia    S/P bilateral inguinal hernia repair    Skin cancer    Spinal fusion failure    Varicose veins with pain    Vitamin D deficiency              Past Surgical History:   Procedure Laterality Date    Back surgery  07/2012    L5/S1 fusion--[facility]    Back surgery  08/2012    'Clean out infection' from recent back surgery, Ab Tx thru PICC X 3 mos.    Egd  12/26/2019    Dr. Powers: hiatal  hernia, Tolentino's, esophagitis - 6 month recall    Hernia surgery Bilateral     Inguinal    Implant left Left 2011    Implant right Right 2011    Other  2015    Medtonic Lumbar Stim System Implant to aid in Tx for p-surgical Incontinence    Other Bilateral 2016    b/l elbow surgery to repair ligament damage--[facility]    Other surgical history      breast augmentation    Other surgical history      rhinoplasty    Thin prep pap  2022    normal    Tonsillectomy                  Social History     Socioeconomic History    Marital status:     Number of children: 1   Occupational History    Occupation: Permanent Disability since    Tobacco Use    Smoking status: Former     Current packs/day: 0.00     Types: Cigarettes     Quit date: 1998     Years since quittin.9    Smokeless tobacco: Never   Vaping Use    Vaping status: Some Days   Substance and Sexual Activity    Alcohol use: No    Drug use: Not Currently     Comment: marijuana occasionally              Review of Systems    Positive for stated complaint: HTN  Other systems are as noted in HPI.  Constitutional and vital signs reviewed.      All other systems reviewed and negative except as noted above.    Physical Exam     ED Triage Vitals   BP 24 1514 154/86   Pulse 24 1514 95   Resp 24 1514 18   Temp 24 1514 98.1 °F (36.7 °C)   Temp src --    SpO2 24 1514 97 %   O2 Device 24 1545 None (Room air)       Current Vitals:   Vital Signs  BP: 129/73  Pulse: 68  Resp: 17  Temp: 98.1 °F (36.7 °C)  MAP (mmHg): 91    Oxygen Therapy  SpO2: 97 %  O2 Device: None (Room air)            Physical Exam  Vitals and nursing note reviewed.   Constitutional:       General: She is not in acute distress.     Appearance: She is well-developed.   HENT:      Head: Normocephalic.      Nose: Nose normal.      Mouth/Throat:      Mouth: Mucous membranes are moist.   Eyes:      Conjunctiva/sclera: Conjunctivae  normal.   Cardiovascular:      Rate and Rhythm: Normal rate and regular rhythm.      Heart sounds: No murmur heard.  Pulmonary:      Effort: Pulmonary effort is normal. No respiratory distress.      Breath sounds: Normal breath sounds.   Abdominal:      General: There is no distension.      Palpations: Abdomen is soft.      Tenderness: There is no abdominal tenderness.   Musculoskeletal:         General: No tenderness. Normal range of motion.      Cervical back: Normal range of motion and neck supple.   Skin:     General: Skin is warm and dry.      Findings: No rash.   Neurological:      General: No focal deficit present.      Mental Status: She is alert and oriented to person, place, and time.      Cranial Nerves: No cranial nerve deficit.      Motor: No weakness.      Coordination: Coordination normal.      Comments: Vague paresthesias in all 4 extremities and no particular dermatome.               ED Course     Labs Reviewed   BASIC METABOLIC PANEL (8) - Abnormal; Notable for the following components:       Result Value    Glucose 114 (*)     Creatinine 1.03 (*)     BUN/CREA Ratio 9.7 (*)     All other components within normal limits   CBC W/ DIFFERENTIAL - Abnormal; Notable for the following components:    WBC 15.2 (*)     RBC 5.58 (*)     Neutrophil Absolute Prelim 9.41 (*)     Neutrophil Absolute 9.41 (*)     Lymphocyte Absolute 4.54 (*)     Monocyte Absolute 1.02 (*)     All other components within normal limits   TROPONIN I HIGH SENSITIVITY - Normal   CBC WITH DIFFERENTIAL WITH PLATELET    Narrative:     The following orders were created for panel order CBC With Differential With Platelet.  Procedure                               Abnormality         Status                     ---------                               -----------         ------                     CBC W/ DIFFERENTIAL[[id number]]          Abnormal            Final result                 Please view results for these tests on the individual  orders.   MD BLOOD SMEAR CONSULT   RAINBOW DRAW LAVENDER   RAINBOW DRAW LIGHT GREEN   RAINBOW DRAW BLUE   RAINBOW DRAW GOLD     EKG    Rate, intervals and axes as noted on EKG Report.  Rate: 82 bpm  Rhythm: Sinus Rhythm  Reading: Nonspecific changes, no acute injury pattern.                          MDM                      Medical Decision Making  Differential diagnosis considered for poorly controlled hypertension, labile blood pressure, anxiety.    Problems Addressed:  Poorly-controlled hypertension: acute illness or injury    Amount and/or Complexity of Data Reviewed  Labs: ordered. Decision-making details documented in ED Course.     Details: Labs unremarkable  Radiology: ordered and independent interpretation performed. Decision-making details documented in ED Course.     Details: Chest x-ray shows normal cardiac size.  CT scan shows no acute abnormalities.  ECG/medicine tests: ordered and independent interpretation performed. Decision-making details documented in ED Course.  Discussion of management or test interpretation with external provider(s): Patient describing a \"heaviness in all of her extremities.\"  CT scan unremarkable.  No chest pain or shortness of breath.  Blood pressure is seems to be poorly controlled with single agent of hydrochlorothiazide.  Will discharge with additional small dose of lisinopril to take in the evenings and follow-up with her primary care physician for blood pressure check in the office early next week.    Risk  Prescription drug management.        Disposition and Plan     Clinical Impression:  1. Poorly-controlled hypertension         Disposition:  Discharge  6/21/2024  6:38 pm    Follow-up:  Christiano Johnson MD  [address] [place] [id number]  [phone]    Schedule an appointment as soon as possible for a visit            Medications Prescribed:  Discharge Medication List as of 6/21/2024  6:43 PM        START taking these medications    Details   lisinopril 10  MG Oral Tab Take 1 tablet (10 mg total) by mouth daily for 14 days., Normal, Disp-14 tablet, R-0

## 2024-06-22 LAB
BASOPHILS # BLD AUTO: 0.1 X10(3) UL (ref 0–0.2)
BASOPHILS NFR BLD AUTO: 0.7 %
DEPRECATED RDW RBC AUTO: 41.7 FL (ref 35.1–46.3)
EOSINOPHIL # BLD AUTO: 0.11 X10(3) UL (ref 0–0.7)
EOSINOPHIL NFR BLD AUTO: 0.7 %
ERYTHROCYTE [DISTWIDTH] IN BLOOD BY AUTOMATED COUNT: 14.3 % (ref 11–15)
HCT VFR BLD AUTO: 45.2 %
HGB BLD-MCNC: 14.6 G/DL
IMM GRANULOCYTES # BLD AUTO: 0.06 X10(3) UL (ref 0–1)
IMM GRANULOCYTES NFR BLD: 0.4 %
LYMPHOCYTES # BLD AUTO: 4.54 X10(3) UL (ref 1–4)
LYMPHOCYTES NFR BLD AUTO: 29.8 %
MCH RBC QN AUTO: 26.2 PG (ref 26–34)
MCHC RBC AUTO-ENTMCNC: 32.3 G/DL (ref 31–37)
MCV RBC AUTO: 81 FL
MONOCYTES # BLD AUTO: 1.02 X10(3) UL (ref 0.1–1)
MONOCYTES NFR BLD AUTO: 6.7 %
NEUTROPHILS # BLD AUTO: 9.41 X10 (3) UL (ref 1.5–7.7)
NEUTROPHILS # BLD AUTO: 9.41 X10(3) UL (ref 1.5–7.7)
NEUTROPHILS NFR BLD AUTO: 61.7 %
PLATELET # BLD AUTO: 421 10(3)UL (ref 150–450)
RBC # BLD AUTO: 5.58 X10(6)UL
WBC # BLD AUTO: 15.2 X10(3) UL (ref 4–11)

## (undated) NOTE — ED AVS SNAPSHOT
Lorin Salgado   MRN: [id number]    Department:  [facility] Emergency Department   Date of Visit:  5/19/2019           Disclosure     Insurance plans vary and the physician(s) referred by the ER may not be covered by your plan.  Please contact CARE PHYSICIAN AT ONCE OR RETURN IMMEDIATELY TO THE EMERGENCY DEPARTMENT. If you have been prescribed any medication(s), please fill your prescription right away and begin taking the medication(s) as directed.   If you believe that any of the medications

## (undated) NOTE — ED AVS SNAPSHOT
Mau Davis   MRN: [id number]    Department:  [facility] Emergency Department   Date of Visit:  8/1/2018           Disclosure     Insurance plans vary and the physician(s) referred by the ER may not be covered by your plan.  Please contact CARE PHYSICIAN AT ONCE OR RETURN IMMEDIATELY TO THE EMERGENCY DEPARTMENT. If you have been prescribed any medication(s), please fill your prescription right away and begin taking the medication(s) as directed.   If you believe that any of the medications

## (undated) NOTE — ED AVS SNAPSHOT
Rachael Katz   MRN: [id number]    Department:  [facility] Emergency Department   Date of Visit:  5/31/2018           Disclosure     Insurance plans vary and the physician(s) referred by the ER may not be covered by your plan.  Please contact CARE PHYSICIAN AT ONCE OR RETURN IMMEDIATELY TO THE EMERGENCY DEPARTMENT. If you have been prescribed any medication(s), please fill your prescription right away and begin taking the medication(s) as directed.   If you believe that any of the medications